# Patient Record
Sex: FEMALE | Race: BLACK OR AFRICAN AMERICAN | Employment: OTHER | URBAN - METROPOLITAN AREA
[De-identification: names, ages, dates, MRNs, and addresses within clinical notes are randomized per-mention and may not be internally consistent; named-entity substitution may affect disease eponyms.]

---

## 2017-01-05 PROBLEM — N88.3 CERVICAL INCOMPETENCE: Status: ACTIVE | Noted: 2017-01-05

## 2017-01-16 PROBLEM — O34.32 MATERNAL CARE FOR CERVICAL INCOMPETENCE IN SECOND TRIMESTER: Status: ACTIVE | Noted: 2017-01-05

## 2017-01-18 PROBLEM — O26.879 CERVICAL SHORTENING: Status: ACTIVE | Noted: 2017-01-18

## 2017-01-19 ENCOUNTER — HOSPITAL ENCOUNTER (EMERGENCY)
Age: 32
Discharge: HOME | End: 2017-01-20
Attending: OBSTETRICS & GYNECOLOGY | Admitting: OBSTETRICS & GYNECOLOGY
Payer: COMMERCIAL

## 2017-01-19 NOTE — IP AVS SNAPSHOT
Paula Jc 
 
 
 [address] [place] [address] 
[phone] Patient: Alexandria Clark MRN: [id number] :1985 You are allergic to the following No active allergies Recent Documentation Height Weight BMI OB Status Smoking Status 1.549 m 80.7 kg 33.63 kg/m2 Pregnant Never Smoker Emergency Contacts Name Discharge Info Relation Home Work Mobile Belkys Vidal  Mother [14] [phone] About your hospitalization You were admitted on:  N/A You last received care in the:  [facility] [place] [facility] You were discharged on:  2017 Unit phone number:  [phone] Why you were hospitalized Your primary diagnosis was:  Not on File Providers Seen During Your Hospitalizations Provider Role Specialty Primary office phone Tiffany Jeffries MD Attending Provider Obstetrics & Gynecology [phone] Your Primary Care Physician (PCP) Primary Care Physician Office Phone Office Fax Melissa [facility] [phone] Follow-up Information None Your Appointments 2017  9:45 AM EST  
CERVICAL LENGTH with [facility] ULTRASOUND 3  
[facility] MATERNAL FETAL MEDICINE ([address] [place] [address]) [address] [place] [id number] [phone] 2017 10:30 AM EST  
OB VISIT with Sandor Marin MD  
[address] [place] [address] ([address] [place] [address]) [address] [place] [id number] [phone] 2017  3:45 PM EST  
OB VISIT with Ivan Owens DO  
[address] [address] [phone] Current Discharge Medication List  
  
ASK your doctor about these medications Dose & Instructions Dispensing Information Comments Morning Noon Evening Bedtime  
 amoxicillin 250 mg capsule Commonly known as:  AMOXIL Your next dose is: Today, Tomorrow Other:  _________ Dose:  500 mg Take 2 Caps by mouth three (3) times daily for 3 days. Indications: GENITOURINARY TRACT INFECTION DUE TO PROTEUS Quantity:  9 Cap Refills:  0  
     
   
   
   
  
 azithromycin 250 mg tablet Commonly known as:  Unice Messing Your next dose is: Today, Tomorrow Other:  _________ Dose:  500 mg Take 2 Tabs by mouth daily for 3 doses. Quantity:  6 Tab Refills:  0  
     
   
   
   
  
 calcium carbonate-mag hydroxid 1,000-200 mg Janine Dears Your next dose is: Today, Tomorrow Other:  _________ Take  by mouth. Refills:  0 Cholecalciferol (Vitamin D3) 3,000 unit Tab Your next dose is: Today, Tomorrow Other:  _________ Take  by mouth. Refills:  0  
     
   
   
   
  
 escitalopram oxalate 5 mg tablet Commonly known as:  Jose Jacobs Your next dose is: Today, Tomorrow Other:  _________ Dose:  10 mg Take 2 Tabs by mouth nightly. Quantity:  30 Tab Refills:  6  
     
   
   
   
  
 famotidine 20 mg tablet Commonly known as:  PEPCID Your next dose is: Today, Tomorrow Other:  _________ Dose:  20 mg Take 1 Tab by mouth two (2) times a day. Quantity:  60 Tab Refills:  6  
     
   
   
   
  
 metroNIDAZOLE 500 mg tablet Commonly known as:  FLAGYL Your next dose is: Today, Tomorrow Other:  _________ Dose:  500 mg Take 1 Tab by mouth three (3) times daily. Quantity:  15 Tab Refills:  0 PRENATAL DHA+COMPLETE PRENATAL -300 mg-mcg-mg Cmpk Generic drug:  PNV no.24-iron-folic acid-dha Your next dose is: Today, Tomorrow Other:  _________ Take  by mouth. Refills:  0 progesterone 200 mg capsule Commonly known as:  PROMETRIUM Your next dose is: Today, Tomorrow Other:  _________ Dose:  200 mg Insert 1 Cap into vagina nightly. Quantity:  30 Cap Refills:  6  
     
   
   
   
  
 TYLENOL EXTRA STRENGTH 500 mg tablet Generic drug:  acetaminophen Your next dose is: Today, Tomorrow Other:  _________ Take  by mouth every six (6) hours as needed for Pain. Refills:  0  
     
   
   
   
  
 ZANTAC 150 mg tablet Generic drug:  raNITIdine Your next dose is: Today, Tomorrow Other:  _________ Dose:  150 mg Take 150 mg by mouth two (2) times a day. Refills:  0 Discharge Instructions What to look for: 6 or more contractions lasting 40 seconds or longer, rupture of membranes, decreased fetal movement, bright red vaginal bleeding. To maintain hydration drink 8-10 glasses of water per day(2liters), keep all scheduled appointments. Learning About When to Call Your Doctor During Pregnancy (After 20 Weeks) Your Care Instructions It's common to have concerns about what might be a problem during pregnancy. Although most pregnant women don't have any serious problems, it's important to know when to call your doctor if you have certain symptoms or signs of labor. These are general suggestions. Your doctor may give you some more information about when to call. When to call your doctor (after 20 weeks) Call 911 anytime you think you may need emergency care. For example, call if: 
· You have severe vaginal bleeding. · You have sudden, severe pain in your belly. · You passed out (lost consciousness). · You have a seizure. · You see or feel the umbilical cord. · You think you are about to deliver your baby and can't make it safely to the hospital. 
Call your doctor now or seek immediate medical care if: 
· You have vaginal bleeding. · You have belly pain. · You have a fever. · You have symptoms of preeclampsia, such as: 
¨ Sudden swelling of your face, hands, or feet. ¨ New vision problems (such as dimness or blurring). ¨ A severe headache. · You have a sudden release of fluid from your vagina. (You think your water broke.) · You think that you may be in labor. This means that you've had at least 4 contractions within 20 minutes or at least 8 contractions in an hour. · You notice that your baby has stopped moving or is moving much less than normal. 
· You have symptoms of a urinary tract infection. These may include: 
¨ Pain or burning when you urinate. ¨ A frequent need to urinate without being able to pass much urine. ¨ Pain in the flank, which is just below the rib cage and above the waist on either side of the back. ¨ Blood in your urine. Watch closely for changes in your health, and be sure to contact your doctor if: 
· You have vaginal discharge that smells bad. · You have skin changes, such as: ¨ A rash. ¨ Itching. ¨ Yellow color to your skin. · You have other concerns about your pregnancy. If you have labor signs at 37 weeks or more If you have signs of labor at 37 weeks or more, your doctor may tell you to call when your labor becomes more active. Symptoms of active labor include: 
· Contractions that are regular. · Contractions that are less than 5 minutes apart. · Contractions that are hard to talk through. Follow-up care is a key part of your treatment and safety. Be sure to make and go to all appointments, and call your doctor if you are having problems. It's also a good idea to know your test results and keep a list of the medicines you take. Where can you learn more? Go to http://steven-viviane.info/. Enter  in the search box to learn more about \"Learning About When to Call Your Doctor During Pregnancy (After 20 Weeks). \" 
Current as of: May 30, 2016 Content Version: 11.1 © 5507-1050 Healthwise, Incorporated. Care instructions adapted under license by [employer] (which disclaims liability or warranty for this information). If you have questions about a medical condition or this instruction, always ask your healthcare professional. [address] any warranty or liability for your use of this information. Discharge Orders None Introducing [facility]! Crescenciorafael Hargrove introduces SlideJar patient portal. Now you can access parts of your medical record, email your doctor's office, and request medication refills online. 1. In your internet browser, go to [email or web address] 2. Click on the First Time User? Click Here link in the Sign In box. You will see the New Member Sign Up page. 3. Enter your SlideJar Access Code exactly as it appears below. You will not need to use this code after youve completed the sign-up process. If you do not sign up before the expiration date, you must request a new code. · SlideJar Access Code: [email or web address] Expires: 4/5/2017  2:48 PM 
 
4. Enter the last four digits of your Social Security Number (xxxx) and Date of Birth (mm/dd/yyyy) as indicated and click Submit. You will be taken to the next sign-up page. 5. Create a SlideJar ID. This will be your SlideJar login ID and cannot be changed, so think of one that is secure and easy to remember. 6. Create a SlideJar password. You can change your password at any time. 7. Enter your Password Reset Question and Answer. This can be used at a later time if you forget your password. 8. Enter your e-mail address. You will receive e-mail notification when new information is available in [address] 9. Click Sign Up. You can now view and download portions of your medical record. 10. Click the Download Summary menu link to download a portable copy of your medical information. If you have questions, please visit the Frequently Asked Questions section of the MyChart website. Remember, MyChart is NOT to be used for urgent needs. For medical emergencies, dial 911. Now available from your iPhone and Android! General Information Please provide this summary of care documentation to your next provider. Patient Signature:  ____________________________________________________________ Date:  ____________________________________________________________  
  
Christian Batman Provider Signature:  ____________________________________________________________ Date:  ____________________________________________________________

## 2017-01-19 NOTE — IP AVS SNAPSHOT
Current Discharge Medication List  
  
ASK your doctor about these medications Dose & Instructions Dispensing Information Comments Morning Noon Evening Bedtime  
 amoxicillin 250 mg capsule Commonly known as:  AMOXIL Your next dose is: Today, Tomorrow Other:  ____________ Dose:  500 mg Take 2 Caps by mouth three (3) times daily for 3 days. Indications: GENITOURINARY TRACT INFECTION DUE TO PROTEUS Quantity:  9 Cap Refills:  0  
     
   
   
   
  
 azithromycin 250 mg tablet Commonly known as:  Keily Lit Your next dose is: Today, Tomorrow Other:  ____________ Dose:  500 mg Take 2 Tabs by mouth daily for 3 doses. Quantity:  6 Tab Refills:  0  
     
   
   
   
  
 calcium carbonate-mag hydroxid 1,000-200 mg Esther Handy Your next dose is: Today, Tomorrow Other:  ____________ Take  by mouth. Refills:  0 Cholecalciferol (Vitamin D3) 3,000 unit Tab Your next dose is: Today, Tomorrow Other:  ____________ Take  by mouth. Refills:  0  
     
   
   
   
  
 escitalopram oxalate 5 mg tablet Commonly known as:  Beaverton Denier Your next dose is: Today, Tomorrow Other:  ____________ Dose:  10 mg Take 2 Tabs by mouth nightly. Quantity:  30 Tab Refills:  6  
     
   
   
   
  
 famotidine 20 mg tablet Commonly known as:  PEPCID Your next dose is: Today, Tomorrow Other:  ____________ Dose:  20 mg Take 1 Tab by mouth two (2) times a day. Quantity:  60 Tab Refills:  6  
     
   
   
   
  
 metroNIDAZOLE 500 mg tablet Commonly known as:  FLAGYL Your next dose is: Today, Tomorrow Other:  ____________ Dose:  500 mg Take 1 Tab by mouth three (3) times daily. Quantity:  15 Tab Refills:  0 PRENATAL DHA+COMPLETE PRENATAL -300 mg-mcg-mg Cmpk Generic drug:  PNV no.24-iron-folic acid-dha Your next dose is: Today, Tomorrow Other:  ____________ Take  by mouth. Refills:  0  
     
   
   
   
  
 progesterone 200 mg capsule Commonly known as:  PROMETRIUM Your next dose is: Today, Tomorrow Other:  ____________ Dose:  200 mg Insert 1 Cap into vagina nightly. Quantity:  30 Cap Refills:  6  
     
   
   
   
  
 TYLENOL EXTRA STRENGTH 500 mg tablet Generic drug:  acetaminophen Your next dose is: Today, Tomorrow Other:  ____________ Take  by mouth every six (6) hours as needed for Pain. Refills:  0  
     
   
   
   
  
 ZANTAC 150 mg tablet Generic drug:  raNITIdine Your next dose is: Today, Tomorrow Other:  ____________ Dose:  150 mg Take 150 mg by mouth two (2) times a day. Refills:  0

## 2017-01-20 VITALS
HEIGHT: 61 IN | BODY MASS INDEX: 33.61 KG/M2 | WEIGHT: 178 LBS | TEMPERATURE: 98.4 F | SYSTOLIC BLOOD PRESSURE: 123 MMHG | DIASTOLIC BLOOD PRESSURE: 67 MMHG | HEART RATE: 97 BPM | RESPIRATION RATE: 20 BRPM

## 2017-01-20 PROCEDURE — 74011250637 HC RX REV CODE- 250/637: Performed by: OBSTETRICS & GYNECOLOGY

## 2017-01-20 PROCEDURE — 99283 EMERGENCY DEPT VISIT LOW MDM: CPT

## 2017-01-20 RX ORDER — HYDROCODONE BITARTRATE AND ACETAMINOPHEN 10; 325 MG/1; MG/1
1 TABLET ORAL ONCE
Status: COMPLETED | OUTPATIENT
Start: 2017-01-20 | End: 2017-01-20

## 2017-01-20 RX ADMIN — HYDROCODONE BITARTRATE AND ACETAMINOPHEN 1 TABLET: 10; 325 TABLET ORAL at 00:53

## 2017-01-20 NOTE — ED PROVIDER NOTES
32 y.o. female at 22w0d  weeks gestation who is seen for moderate headache . Headache ongoing past few days. Worse tonight. No visual changes. lortab was helpful a few days ago. HISTORY:    History   Sexual Activity    Sexual activity: Yes    Partners: Male    Birth control/ protection: None     Patient's last menstrual period was 09/07/2016 (exact date). Social History     Social History    Marital status: SINGLE     Spouse name: N/A    Number of children: N/A    Years of education: N/A     Occupational History    Not on file. Social History Main Topics    Smoking status: Never Smoker    Smokeless tobacco: Never Used    Alcohol use No      Comment: occ    Drug use: No    Sexual activity: Yes     Partners: Male     Birth control/ protection: None     Other Topics Concern    Not on file     Social History Narrative       Past Surgical History   Procedure Laterality Date    Hx wisdom teeth extraction      Hx dilation and curettage         Past Medical History   Diagnosis Date    Asthma      exercise induced    Supervision of high-risk pregnancy 11/9/2016    Vaginal bleeding in first trimester 12/2/2016     No further bleeding. ROS:  A 12 point review of symptoms negative except for chief complaint as described above. PHYSICAL EXAM:  Blood pressure 123/67, pulse 97, temperature 98.4 °F (36.9 °C), resp. rate 20, height 5' 1\" (1.549 m), weight 80.7 kg (178 lb), last menstrual period 09/07/2016, unknown if currently breastfeeding. The patient appears well, alert, oriented x 3. Lungs are clear. Heart RRR, no murmurs.    Abdomen soft, nontender, no guarding  No cva tenderness  No fundal tenderness  Upper ext: no edema, reflexes +2  Lower ext: no edema, neg dana's, reflexes +2  SVE:deferred  FHT:+  No neuro deficits      Assessment/Plan: headache in pregancy  Discussed tylenol, caffeine  Given norco 10/325 in triage  F/u as scheduled or sooner as needed

## 2017-01-20 NOTE — DISCHARGE INSTRUCTIONS
What to look for: 6 or more contractions lasting 40 seconds or longer, rupture of membranes, decreased fetal movement, bright red vaginal bleeding. To maintain hydration drink 8-10 glasses of water per day(2liters), keep all scheduled appointments. Learning About When to Call Your Doctor During Pregnancy (After 20 Weeks)  Your Care Instructions  It's common to have concerns about what might be a problem during pregnancy. Although most pregnant women don't have any serious problems, it's important to know when to call your doctor if you have certain symptoms or signs of labor. These are general suggestions. Your doctor may give you some more information about when to call. When to call your doctor (after 20 weeks)  Call 911 anytime you think you may need emergency care. For example, call if:  · You have severe vaginal bleeding. · You have sudden, severe pain in your belly. · You passed out (lost consciousness). · You have a seizure. · You see or feel the umbilical cord. · You think you are about to deliver your baby and can't make it safely to the hospital.  Call your doctor now or seek immediate medical care if:  · You have vaginal bleeding. · You have belly pain. · You have a fever. · You have symptoms of preeclampsia, such as:  ¨ Sudden swelling of your face, hands, or feet. ¨ New vision problems (such as dimness or blurring). ¨ A severe headache. · You have a sudden release of fluid from your vagina. (You think your water broke.)  · You think that you may be in labor. This means that you've had at least 4 contractions within 20 minutes or at least 8 contractions in an hour. · You notice that your baby has stopped moving or is moving much less than normal.  · You have symptoms of a urinary tract infection. These may include:  ¨ Pain or burning when you urinate. ¨ A frequent need to urinate without being able to pass much urine.   ¨ Pain in the flank, which is just below the rib cage and above the waist on either side of the back. ¨ Blood in your urine. Watch closely for changes in your health, and be sure to contact your doctor if:  · You have vaginal discharge that smells bad. · You have skin changes, such as:  ¨ A rash. ¨ Itching. ¨ Yellow color to your skin. · You have other concerns about your pregnancy. If you have labor signs at 37 weeks or more  If you have signs of labor at 37 weeks or more, your doctor may tell you to call when your labor becomes more active. Symptoms of active labor include:  · Contractions that are regular. · Contractions that are less than 5 minutes apart. · Contractions that are hard to talk through. Follow-up care is a key part of your treatment and safety. Be sure to make and go to all appointments, and call your doctor if you are having problems. It's also a good idea to know your test results and keep a list of the medicines you take. Where can you learn more? Go to http://steven-viviane.info/. Enter  in the search box to learn more about \"Learning About When to Call Your Doctor During Pregnancy (After 20 Weeks). \"  Current as of: May 30, 2016  Content Version: 11.1  © 1838-1243 [employer]. Care instructions adapted under license by [employer] (which disclaims liability or warranty for this information). If you have questions about a medical condition or this instruction, always ask your healthcare professional. [place] [address] any warranty or liability for your use of this information.

## 2017-04-15 ENCOUNTER — HOSPITAL ENCOUNTER (EMERGENCY)
Age: 32
Discharge: HOME | End: 2017-04-15
Payer: COMMERCIAL

## 2017-04-15 VITALS
RESPIRATION RATE: 18 BRPM | HEIGHT: 61 IN | OXYGEN SATURATION: 96 % | DIASTOLIC BLOOD PRESSURE: 77 MMHG | HEART RATE: 72 BPM | BODY MASS INDEX: 33.99 KG/M2 | WEIGHT: 180 LBS | SYSTOLIC BLOOD PRESSURE: 143 MMHG

## 2017-04-15 DIAGNOSIS — K64.5 HEMORRHOIDS, THROMBOSED: Primary | ICD-10-CM

## 2017-04-15 PROCEDURE — 99282 EMERGENCY DEPT VISIT SF MDM: CPT

## 2017-04-15 RX ORDER — HYDROCORTISONE 25 MG/G
CREAM TOPICAL 4 TIMES DAILY
Qty: 30 G | Refills: 0 | Status: SHIPPED | OUTPATIENT
Start: 2017-04-15 | End: 2017-04-15

## 2017-04-15 RX ORDER — POLYETHYLENE GLYCOL 3350 17 G/17G
17 POWDER, FOR SOLUTION ORAL DAILY
COMMUNITY

## 2017-04-15 RX ORDER — HYDROCORTISONE 25 MG/G
CREAM TOPICAL 4 TIMES DAILY
Qty: 30 G | Refills: 0 | Status: SHIPPED | OUTPATIENT
Start: 2017-04-15

## 2017-04-15 RX ORDER — AMOXICILLIN AND CLAVULANATE POTASSIUM 875; 125 MG/1; MG/1
1 TABLET, FILM COATED ORAL 2 TIMES DAILY
Qty: 14 TAB | Refills: 0 | Status: SHIPPED | OUTPATIENT
Start: 2017-04-15 | End: 2017-04-15

## 2017-04-15 RX ORDER — AMOXICILLIN AND CLAVULANATE POTASSIUM 875; 125 MG/1; MG/1
1 TABLET, FILM COATED ORAL 2 TIMES DAILY
Qty: 14 TAB | Refills: 0 | Status: SHIPPED | OUTPATIENT
Start: 2017-04-15

## 2017-04-15 RX ORDER — DOCUSATE SODIUM 100 MG/1
200 CAPSULE, LIQUID FILLED ORAL 2 TIMES DAILY
COMMUNITY

## 2017-04-15 NOTE — ED NOTES
Pt presents to ER for hemorrhoidal pain that has increased since delivering her daughter this past Tues. Pt has been unable to sit and states that she has been blood and purulence from the site.

## 2017-04-17 ENCOUNTER — HOSPITAL ENCOUNTER (OUTPATIENT)
Age: 32
Setting detail: OUTPATIENT SURGERY
End: 2017-04-17
Attending: SURGERY | Admitting: SURGERY

## 2017-04-17 ENCOUNTER — HOSPITAL ENCOUNTER (OUTPATIENT)
Dept: SURGERY | Age: 32
Discharge: HOME | End: 2017-04-17

## 2017-04-17 VITALS — HEIGHT: 61 IN | BODY MASS INDEX: 32.1 KG/M2 | WEIGHT: 170 LBS

## 2017-04-17 NOTE — PERIOP NOTES
Received phone call from Fairy Primrose in [place] scheduling, pt called and case has been added back on to surgery schedule for tomorrow. Informed Fairy Primrose that complete phone assessment performed earlier today. Chart sent to Pre Op.

## 2017-04-17 NOTE — PERIOP NOTES
Lab results in EMR dated 4/10/17 and 4/11/17 under Care Everywhere/ [facility] for reference.

## 2017-04-17 NOTE — PERIOP NOTES
Patient verified name, , and surgery as listed in [facility]. Type 1B surgery, phone assessment complete. Orders not received. Labs per surgeon: none  Labs per anesthesia protocol: none      Patient answered medical/surgical history questions at their best of ability. All prior to admission medications documented in [facility]. Patient instructed to take the following medications the day of surgery according to anesthesia guidelines with a small sip of water: none . Hold all vitamins 7 days prior to surgery and NSAIDS 5 days prior to surgery. Medications to be held vitamins    Patient instructed on the following and verbalizes understanding:  Arrive at A Entrance, time of arrival to be called the day before by 1700  NPO after midnight including gum, mints, and ice chips  Responsible adult must drive patient to the hospital, stay during surgery, and patient will  need supervision 24 hours after anesthesia  Use dial in shower the night before surgery and on the morning of surgery  Leave all valuables(money and jewelry) at home but bring insurance card and ID on DOS  Do not wear make-up, nail polish, lotions, cologne, perfumes, powders, or oil on skin.

## 2017-04-17 NOTE — PERIOP NOTES
Received call from Jourdan at [address] [place] [address] scheduling office, pt has cancelled procedure.

## 2017-04-18 ENCOUNTER — HOSPITAL ENCOUNTER (OUTPATIENT)
Age: 32
Setting detail: OUTPATIENT SURGERY
Discharge: HOME | End: 2017-04-18
Attending: SURGERY | Admitting: SURGERY
Payer: COMMERCIAL

## 2017-04-18 ENCOUNTER — ANESTHESIA EVENT (OUTPATIENT)
Dept: SURGERY | Age: 32
End: 2017-04-18
Payer: COMMERCIAL

## 2017-04-18 ENCOUNTER — ANESTHESIA (OUTPATIENT)
Dept: SURGERY | Age: 32
End: 2017-04-18
Payer: COMMERCIAL

## 2017-04-18 VITALS
DIASTOLIC BLOOD PRESSURE: 70 MMHG | WEIGHT: 168.8 LBS | OXYGEN SATURATION: 96 % | BODY MASS INDEX: 31.87 KG/M2 | TEMPERATURE: 98 F | HEIGHT: 61 IN | HEART RATE: 80 BPM | SYSTOLIC BLOOD PRESSURE: 141 MMHG | RESPIRATION RATE: 16 BRPM

## 2017-04-18 DIAGNOSIS — O22.40: Primary | ICD-10-CM

## 2017-04-18 LAB — HCG UR QL: NEGATIVE

## 2017-04-18 PROCEDURE — 74011250636 HC RX REV CODE- 250/636

## 2017-04-18 PROCEDURE — 76210000016 HC OR PH I REC 1 TO 1.5 HR: Performed by: SURGERY

## 2017-04-18 PROCEDURE — 74011000250 HC RX REV CODE- 250: Performed by: SURGERY

## 2017-04-18 PROCEDURE — 74011250637 HC RX REV CODE- 250/637: Performed by: ANESTHESIOLOGY

## 2017-04-18 PROCEDURE — 77030014008 HC SPNG HEMSTAT J&J -C: Performed by: SURGERY

## 2017-04-18 PROCEDURE — 77030031753 HC SHR ENDO COAG HARM J&J -E: Performed by: SURGERY

## 2017-04-18 PROCEDURE — 74011000250 HC RX REV CODE- 250

## 2017-04-18 PROCEDURE — 77030011640 HC PAD GRND REM COVD -A: Performed by: SURGERY

## 2017-04-18 PROCEDURE — 76060000032 HC ANESTHESIA 0.5 TO 1 HR: Performed by: SURGERY

## 2017-04-18 PROCEDURE — 74011250636 HC RX REV CODE- 250/636: Performed by: SURGERY

## 2017-04-18 PROCEDURE — 88304 TISSUE EXAM BY PATHOLOGIST: CPT | Performed by: SURGERY

## 2017-04-18 PROCEDURE — 74011000250 HC RX REV CODE- 250: Performed by: ANESTHESIOLOGY

## 2017-04-18 PROCEDURE — 76010000138 HC OR TIME 0.5 TO 1 HR: Performed by: SURGERY

## 2017-04-18 PROCEDURE — 77030032490 HC SLV COMPR SCD KNE COVD -B: Performed by: SURGERY

## 2017-04-18 PROCEDURE — 81025 URINE PREGNANCY TEST: CPT

## 2017-04-18 PROCEDURE — 77030018390 HC SPNG HEMSTAT2 J&J -B: Performed by: SURGERY

## 2017-04-18 PROCEDURE — 74011250636 HC RX REV CODE- 250/636: Performed by: ANESTHESIOLOGY

## 2017-04-18 PROCEDURE — 77030020143 HC AIRWY LARYN INTUB CGAS -A: Performed by: ANESTHESIOLOGY

## 2017-04-18 PROCEDURE — 76210000021 HC REC RM PH II 0.5 TO 1 HR: Performed by: SURGERY

## 2017-04-18 PROCEDURE — 77030018836 HC SOL IRR NACL ICUM -A: Performed by: SURGERY

## 2017-04-18 PROCEDURE — 77030020782 HC GWN BAIR PAWS FLX 3M -B: Performed by: ANESTHESIOLOGY

## 2017-04-18 RX ORDER — KETOROLAC TROMETHAMINE 30 MG/ML
INJECTION, SOLUTION INTRAMUSCULAR; INTRAVENOUS AS NEEDED
Status: DISCONTINUED | OUTPATIENT
Start: 2017-04-18 | End: 2017-04-18 | Stop reason: HOSPADM

## 2017-04-18 RX ORDER — ADHESIVE BANDAGE
30 BANDAGE TOPICAL DAILY
Qty: 1 BOTTLE | Refills: 3 | Status: SHIPPED | OUTPATIENT
Start: 2017-04-18

## 2017-04-18 RX ORDER — FENTANYL CITRATE 50 UG/ML
INJECTION, SOLUTION INTRAMUSCULAR; INTRAVENOUS AS NEEDED
Status: DISCONTINUED | OUTPATIENT
Start: 2017-04-18 | End: 2017-04-18 | Stop reason: HOSPADM

## 2017-04-18 RX ORDER — SODIUM CHLORIDE 0.9 % (FLUSH) 0.9 %
5-10 SYRINGE (ML) INJECTION AS NEEDED
Status: CANCELLED | OUTPATIENT
Start: 2017-04-18

## 2017-04-18 RX ORDER — FAMOTIDINE 20 MG/1
20 TABLET, FILM COATED ORAL ONCE
Status: COMPLETED | OUTPATIENT
Start: 2017-04-18 | End: 2017-04-18

## 2017-04-18 RX ORDER — MIDAZOLAM HYDROCHLORIDE 1 MG/ML
5 INJECTION, SOLUTION INTRAMUSCULAR; INTRAVENOUS ONCE
Status: CANCELLED | OUTPATIENT
Start: 2017-04-18 | End: 2017-04-18

## 2017-04-18 RX ORDER — OXYCODONE AND ACETAMINOPHEN 5; 325 MG/1; MG/1
2 TABLET ORAL
Qty: 40 TAB | Refills: 0 | Status: SHIPPED | OUTPATIENT
Start: 2017-04-18

## 2017-04-18 RX ORDER — SILVER SULFADIAZINE 10 G/1000G
CREAM TOPICAL AS NEEDED
Status: DISCONTINUED | OUTPATIENT
Start: 2017-04-18 | End: 2017-04-18 | Stop reason: HOSPADM

## 2017-04-18 RX ORDER — LIDOCAINE HYDROCHLORIDE 20 MG/ML
INJECTION, SOLUTION EPIDURAL; INFILTRATION; INTRACAUDAL; PERINEURAL AS NEEDED
Status: DISCONTINUED | OUTPATIENT
Start: 2017-04-18 | End: 2017-04-18 | Stop reason: HOSPADM

## 2017-04-18 RX ORDER — HYDROCODONE BITARTRATE AND ACETAMINOPHEN 5; 325 MG/1; MG/1
1 TABLET ORAL AS NEEDED
Status: DISCONTINUED | OUTPATIENT
Start: 2017-04-18 | End: 2017-04-18 | Stop reason: HOSPADM

## 2017-04-18 RX ORDER — OXYCODONE AND ACETAMINOPHEN 5; 325 MG/1; MG/1
1 TABLET ORAL ONCE
Status: COMPLETED | OUTPATIENT
Start: 2017-04-18 | End: 2017-04-18

## 2017-04-18 RX ORDER — BUPIVACAINE HYDROCHLORIDE AND EPINEPHRINE 2.5; 5 MG/ML; UG/ML
INJECTION, SOLUTION EPIDURAL; INFILTRATION; INTRACAUDAL; PERINEURAL AS NEEDED
Status: DISCONTINUED | OUTPATIENT
Start: 2017-04-18 | End: 2017-04-18 | Stop reason: HOSPADM

## 2017-04-18 RX ORDER — CEFAZOLIN SODIUM IN 0.9 % NACL 2 G/50 ML
2 INTRAVENOUS SOLUTION, PIGGYBACK (ML) INTRAVENOUS ONCE
Status: COMPLETED | OUTPATIENT
Start: 2017-04-18 | End: 2017-04-18

## 2017-04-18 RX ORDER — HYDROMORPHONE HYDROCHLORIDE 2 MG/ML
0.5 INJECTION, SOLUTION INTRAMUSCULAR; INTRAVENOUS; SUBCUTANEOUS
Status: DISCONTINUED | OUTPATIENT
Start: 2017-04-18 | End: 2017-04-18 | Stop reason: HOSPADM

## 2017-04-18 RX ORDER — SODIUM CHLORIDE, SODIUM LACTATE, POTASSIUM CHLORIDE, CALCIUM CHLORIDE 600; 310; 30; 20 MG/100ML; MG/100ML; MG/100ML; MG/100ML
150 INJECTION, SOLUTION INTRAVENOUS CONTINUOUS
Status: DISCONTINUED | OUTPATIENT
Start: 2017-04-18 | End: 2017-04-18 | Stop reason: HOSPADM

## 2017-04-18 RX ORDER — ONDANSETRON 2 MG/ML
INJECTION INTRAMUSCULAR; INTRAVENOUS AS NEEDED
Status: DISCONTINUED | OUTPATIENT
Start: 2017-04-18 | End: 2017-04-18 | Stop reason: HOSPADM

## 2017-04-18 RX ORDER — MINERAL OIL
15 OIL (ML) ORAL DAILY
Qty: 1 BOTTLE | Refills: 3 | Status: SHIPPED | OUTPATIENT
Start: 2017-04-18

## 2017-04-18 RX ORDER — ACETAMINOPHEN 500 MG
1000 TABLET ORAL
Status: DISCONTINUED | OUTPATIENT
Start: 2017-04-18 | End: 2017-04-18 | Stop reason: HOSPADM

## 2017-04-18 RX ORDER — DEXAMETHASONE SODIUM PHOSPHATE 4 MG/ML
INJECTION, SOLUTION INTRA-ARTICULAR; INTRALESIONAL; INTRAMUSCULAR; INTRAVENOUS; SOFT TISSUE AS NEEDED
Status: DISCONTINUED | OUTPATIENT
Start: 2017-04-18 | End: 2017-04-18 | Stop reason: HOSPADM

## 2017-04-18 RX ORDER — FENTANYL CITRATE 50 UG/ML
100 INJECTION, SOLUTION INTRAMUSCULAR; INTRAVENOUS ONCE
Status: CANCELLED | OUTPATIENT
Start: 2017-04-18 | End: 2017-04-18

## 2017-04-18 RX ORDER — SODIUM CHLORIDE 0.9 % (FLUSH) 0.9 %
5-10 SYRINGE (ML) INJECTION AS NEEDED
Status: DISCONTINUED | OUTPATIENT
Start: 2017-04-18 | End: 2017-04-18 | Stop reason: HOSPADM

## 2017-04-18 RX ORDER — DIBUCAINE 1 %
OINTMENT (GRAM) TOPICAL 3 TIMES DAILY
Qty: 30 G | Refills: 0 | Status: SHIPPED | OUTPATIENT
Start: 2017-04-18

## 2017-04-18 RX ORDER — PROPOFOL 10 MG/ML
INJECTION, EMULSION INTRAVENOUS AS NEEDED
Status: DISCONTINUED | OUTPATIENT
Start: 2017-04-18 | End: 2017-04-18 | Stop reason: HOSPADM

## 2017-04-18 RX ORDER — SODIUM CHLORIDE 9 MG/ML
50 INJECTION, SOLUTION INTRAVENOUS CONTINUOUS
Status: DISCONTINUED | OUTPATIENT
Start: 2017-04-18 | End: 2017-04-18 | Stop reason: HOSPADM

## 2017-04-18 RX ORDER — SODIUM CHLORIDE 0.9 % (FLUSH) 0.9 %
5-10 SYRINGE (ML) INJECTION EVERY 8 HOURS
Status: CANCELLED | OUTPATIENT
Start: 2017-04-18

## 2017-04-18 RX ORDER — LIDOCAINE HYDROCHLORIDE 10 MG/ML
0.1 INJECTION INFILTRATION; PERINEURAL AS NEEDED
Status: DISCONTINUED | OUTPATIENT
Start: 2017-04-18 | End: 2017-04-18 | Stop reason: HOSPADM

## 2017-04-18 RX ORDER — FAMOTIDINE 10 MG/ML
20 INJECTION INTRAVENOUS ONCE
Status: CANCELLED | OUTPATIENT
Start: 2017-04-18 | End: 2017-04-18

## 2017-04-18 RX ORDER — LIDOCAINE HYDROCHLORIDE 20 MG/ML
JELLY TOPICAL AS NEEDED
Status: DISCONTINUED | OUTPATIENT
Start: 2017-04-18 | End: 2017-04-18 | Stop reason: HOSPADM

## 2017-04-18 RX ORDER — MIDAZOLAM HYDROCHLORIDE 1 MG/ML
2 INJECTION, SOLUTION INTRAMUSCULAR; INTRAVENOUS
Status: DISCONTINUED | OUTPATIENT
Start: 2017-04-18 | End: 2017-04-18 | Stop reason: HOSPADM

## 2017-04-18 RX ADMIN — CEFAZOLIN 2 G: 1 INJECTION, POWDER, FOR SOLUTION INTRAMUSCULAR; INTRAVENOUS; PARENTERAL at 15:04

## 2017-04-18 RX ADMIN — FENTANYL CITRATE 50 MCG: 50 INJECTION, SOLUTION INTRAMUSCULAR; INTRAVENOUS at 15:01

## 2017-04-18 RX ADMIN — OXYCODONE HYDROCHLORIDE AND ACETAMINOPHEN 1 TABLET: 5; 325 TABLET ORAL at 16:42

## 2017-04-18 RX ADMIN — HYDROMORPHONE HYDROCHLORIDE 0.5 MG: 2 INJECTION, SOLUTION INTRAMUSCULAR; INTRAVENOUS; SUBCUTANEOUS at 16:07

## 2017-04-18 RX ADMIN — HYDROMORPHONE HYDROCHLORIDE 0.5 MG: 2 INJECTION, SOLUTION INTRAMUSCULAR; INTRAVENOUS; SUBCUTANEOUS at 16:12

## 2017-04-18 RX ADMIN — LIDOCAINE HYDROCHLORIDE 0.1 ML: 10 INJECTION, SOLUTION INFILTRATION; PERINEURAL at 13:15

## 2017-04-18 RX ADMIN — ONDANSETRON 4 MG: 2 INJECTION INTRAMUSCULAR; INTRAVENOUS at 15:09

## 2017-04-18 RX ADMIN — SODIUM CHLORIDE, SODIUM LACTATE, POTASSIUM CHLORIDE, AND CALCIUM CHLORIDE: 600; 310; 30; 20 INJECTION, SOLUTION INTRAVENOUS at 14:58

## 2017-04-18 RX ADMIN — MIDAZOLAM HYDROCHLORIDE 2 MG: 1 INJECTION, SOLUTION INTRAMUSCULAR; INTRAVENOUS at 14:47

## 2017-04-18 RX ADMIN — PROPOFOL 200 MG: 10 INJECTION, EMULSION INTRAVENOUS at 15:01

## 2017-04-18 RX ADMIN — FAMOTIDINE 20 MG: 20 TABLET ORAL at 12:49

## 2017-04-18 RX ADMIN — FENTANYL CITRATE 25 MCG: 50 INJECTION, SOLUTION INTRAMUSCULAR; INTRAVENOUS at 15:11

## 2017-04-18 RX ADMIN — LIDOCAINE HYDROCHLORIDE 100 MG: 20 INJECTION, SOLUTION EPIDURAL; INFILTRATION; INTRACAUDAL; PERINEURAL at 15:01

## 2017-04-18 RX ADMIN — KETOROLAC TROMETHAMINE 30 MG: 30 INJECTION, SOLUTION INTRAMUSCULAR; INTRAVENOUS at 15:34

## 2017-04-18 RX ADMIN — SODIUM CHLORIDE, SODIUM LACTATE, POTASSIUM CHLORIDE, AND CALCIUM CHLORIDE 150 ML/HR: 600; 310; 30; 20 INJECTION, SOLUTION INTRAVENOUS at 13:15

## 2017-04-18 RX ADMIN — HYDROMORPHONE HYDROCHLORIDE 0.5 MG: 2 INJECTION, SOLUTION INTRAMUSCULAR; INTRAVENOUS; SUBCUTANEOUS at 16:26

## 2017-04-18 RX ADMIN — DEXAMETHASONE SODIUM PHOSPHATE 10 MG: 4 INJECTION, SOLUTION INTRA-ARTICULAR; INTRALESIONAL; INTRAMUSCULAR; INTRAVENOUS; SOFT TISSUE at 15:09

## 2017-04-18 NOTE — PERIOP NOTES
Discharge instructions reviewed with patient and family  Prescriptions given  Hard copy signed and placed on the chart  Verbalized understanding

## 2017-04-18 NOTE — INTERVAL H&P NOTE
H&P Update:  Dewayne Goldberg was seen and examined. History and physical has been reviewed. The patient has been examined.  There have been no significant clinical changes since the completion of the originally dated History and Physical.    Signed By: Cristi Mendoza DO     April 18, 2017 2:31 PM

## 2017-04-18 NOTE — OP NOTES
[address]   OPERATIVE REPORT       Name:  Jaspal Peña   MR#:  [id number]   :  1985   Account #:  [de-identified]   Date of Adm:  2017       DATE OF PROCEDURE: 2017     PREOPERATIVE DIAGNOSIS: Hemorrhoids with thrombosis and   bleeding. POSTOPERATIVE DIAGNOSIS: Hemorrhoids with thrombosis and   bleeding. PROCEDURE: Hemorrhoidectomy with Harmonic scalpel. ANESTHESIA: General endotracheal.    SURGEON: John Paul Albarado DO.    ASSISTANT: None. COMPLICATIONS: None. DISPOSITION: Stable. COUNTS: Sponge count, needle count, instrument count, all   correct x3. DESCRIPTION OF PROCEDURE: This is a 69-year-old female   postpartum 1 week with thrombosed hemorrhoids, pain and   bleeding. She is prepared for hemorrhoidectomy with Harmonic   scalpel. She had been administered a bowel prep with MiraLax the   night prior to the exam and consent was obtained by describing   the procedure to the patient, including the potential   complications to include infection, bleeding and pain. Consent   was obtained, placed upon the chart. She was administered Ancef   2 grams IV preoperatively, taken to the operative suite, placed   in a supine position and general anesthesia was initiated   without complications. She was transferred to lithotomy, prepped   and draped in the usual sterile fashion. Time-out was taken to   confirm the patient's name and proper procedure. Following this,   a rectal speculum was placed into the anal vault and a brief   survey revealed a large thrombosed hemorrhoid with skin necrosis   at the 3 o'clock position. There was an additional thrombosed   hemorrhoid at the 7 o'clock position. No other hemorrhoids   noted.  At this point, we decided to resect the hemorrhoids using   a Harmonic scalpel, 0.25% with epinephrine was used to   anesthetize the skin and subcutaneous tissue and then a Harmonic   scalpel was used to resect the hemorrhoid spanning from the 2   o'clock to the 4 o'clock position. Harmonic scalpel was used   to create hemostasis. The hemorrhoid was sent as 3 o'clock   hemorrhoid. We then addressed the 7 o'clock hemorrhoid. We anesthetized the skin and subcutaneous tissue with 0.25% Marcaine   with epinephrine and the hemorrhoid was resected using the   Harmonic scalpel, labeled as 7 o'clock hemorrhoid. At this   point, hemostasis was confirmed. We added additional local   anesthesia. We then placed a fibrillar soaked sponge with   Silvadene and viscous lidocaine, and concluded the case. She   tolerated the procedure well without immediate complications. FINDINGS: A 43-year-old female postpartum 1 week with thrombosed   hemorrhoids. Harmonic scalpel hemorrhoidectomy was performed at   the 3 and 7 o'clock positions without immediate complications. She tolerated the procedure well. NAMRATA BENJAMIN DO DD / Callum Officer   D:  04/18/2017   15:40   T:  04/18/2017   17:06   Job #:  [id number]

## 2017-04-18 NOTE — ANESTHESIA POSTPROCEDURE EVALUATION
Post-Anesthesia Evaluation and Assessment    Patient: Lawyer Clifford MRN: [id number]  SSN: [id number]    YOB: 1985  Age: 32 y.o. Sex: female       Cardiovascular Function/Vital Signs  Visit Vitals    /81    Pulse 92    Temp 36.7 °C (98 °F)    Resp 14    Ht 5' 1\" (1.549 m)    Wt 76.6 kg (168 lb 12.8 oz)    SpO2 97%    BMI 31.89 kg/m2       Patient is status post general anesthesia for Procedure(s): HEMORRHOIDECTOMY . Nausea/Vomiting: None    Postoperative hydration reviewed and adequate. Pain:  Pain Scale 1: Numeric (0 - 10) (04/18/17 1626)  Pain Intensity 1: 8 (04/18/17 1626)   Managed    Neurological Status:   Neuro (WDL): Exceptions to WDL (04/18/17 1548)  Neuro  Neurologic State: Alert (04/18/17 1618)  Cognition: Follows commands (04/18/17 1603)  LUE Motor Response: Purposeful (04/18/17 1553)  LLE Motor Response: Purposeful (04/18/17 1553)  RUE Motor Response: Purposeful (04/18/17 1553)  RLE Motor Response: Purposeful (04/18/17 1553)   At baseline    Mental Status and Level of Consciousness: Arousable    Pulmonary Status:   O2 Device: Room air (04/18/17 1618)   Adequate oxygenation and airway patent    Complications related to anesthesia: None    Post-anesthesia assessment completed.  No concerns    Signed By: Syeda Schneider MD     April 18, 2017

## 2017-04-18 NOTE — ANESTHESIA PREPROCEDURE EVALUATION
Anesthetic History   No history of anesthetic complications            Review of Systems / Medical History  Patient summary reviewed and pertinent labs reviewed    Pulmonary  Within defined limits                 Neuro/Psych   Within defined limits           Cardiovascular  Within defined limits                Exercise tolerance: >4 METS     GI/Hepatic/Renal  Within defined limits              Endo/Other  Within defined limits           Other Findings              Physical Exam    Airway  Mallampati: II  TM Distance: > 6 cm  Neck ROM: normal range of motion   Mouth opening: Normal     Cardiovascular  Regular rate and rhythm,  S1 and S2 normal,  no murmur, click, rub, or gallop  Rhythm: regular  Rate: normal         Dental  No notable dental hx       Pulmonary  Breath sounds clear to auscultation               Abdominal  GI exam deferred       Other Findings            Anesthetic Plan    ASA: 2  Anesthesia type: general          Induction: Intravenous  Anesthetic plan and risks discussed with: Patient      Pt is 1 week post-partum. She is using donor breast milk for her  girl who is over in the NICU at [facility], so she is pumping and dumping for at least 2-3 days.

## 2017-04-18 NOTE — IP AVS SNAPSHOT
[employer] 
 
 
 [address] [place] [address] 
[phone] Patient: Chester Arroyo MRN: [id number] :1985 You are allergic to the following No active allergies Recent Documentation Height Weight BMI OB Status Smoking Status 1.549 m 76.6 kg 31.89 kg/m2 Postpartum Never Smoker Emergency Contacts Name Discharge Info Relation Home Work Mobile [address] [employer] [facility] Conjunct CAREGIVER [3] Spouse [3]   [phone] Hood Gorman DISCHARGE CAREGIVER [3] Mother [14]   [phone] About your hospitalization You were admitted on:  2017 You last received care in the:  [facility] PACU You were discharged on:  2017 Unit phone number:  [phone] Why you were hospitalized Your primary diagnosis was:  Not on File Providers Seen During Your Hospitalizations Provider Role Specialty Primary office phone Catarino Cary DO Attending Provider General Surgery [phone] Your Primary Care Physician (PCP) Primary Care Physician Office Phone [facility] [phone] Follow-up Information Follow up With Details Comments Contact Info Idalia Vásquez MD   [facility] [address] 
[phone] Moose Jones DO Schedule an appointment as soon as possible for a visit today  [address] [address] [address] [facility] [address] 
[phone] Your Appointments Monday May 08, 2017 10:45 AM EDT Global Post Op with Moose Jones DO  
[facility] ([facility]) [address] [address] [phone] Current Discharge Medication List  
  
START taking these medications Dose & Instructions Dispensing Information Comments Morning Noon Evening Bedtime  
 dibucaine 1 % ointment Commonly known as:  Eileen Silverman Your last dose was: Your next dose is:    
   
   
 Apply  to affected area three (3) times daily. Quantity:  30 g Refills:  0  
     
   
   
   
  
 magnesium hydroxide 400 mg/5 mL suspension Commonly known as:  MILK OF MAGNESIA Your last dose was: Your next dose is:    
   
   
 Dose:  30 mL Take 30 mL by mouth daily. Quantity:  1 Bottle Refills:  3  
     
   
   
   
  
 mineral oil liquid Your last dose was: Your next dose is:    
   
   
 Dose:  15 mL Take 15 mL by mouth daily. Quantity:  1 Bottle Refills:  3  
     
   
   
   
  
 oxyCODONE-acetaminophen 5-325 mg per tablet Commonly known as:  PERCOCET Your last dose was: Your next dose is:    
   
   
 Dose:  2 Tab Take 2 Tabs by mouth every four (4) hours as needed for Pain. Max Daily Amount: 12 Tabs. Quantity:  40 Tab Refills:  0 CONTINUE these medications which have NOT CHANGED Dose & Instructions Dispensing Information Comments Morning Noon Evening Bedtime  
 amoxicillin 250 mg capsule Commonly known as:  AMOXIL Your last dose was: Your next dose is:    
   
   
 Dose:  500 mg Take 2 Caps by mouth three (3) times daily for 3 days. Indications: GENITOURINARY TRACT INFECTION DUE TO PROTEUS Quantity:  9 Cap Refills:  0  
     
   
   
   
  
 amoxicillin-clavulanate 875-125 mg per tablet Commonly known as:  AUGMENTIN Your last dose was: Your next dose is:    
   
   
 Dose:  1 Tab Take 1 Tab by mouth two (2) times a day. Quantity:  14 Tab Refills:  0  
     
   
   
   
  
 azithromycin 250 mg tablet Commonly known as:  Russ Messina Your last dose was: Your next dose is:    
   
   
 Dose:  500 mg Take 2 Tabs by mouth daily for 3 doses. Quantity:  6 Tab Refills:  0 Cholecalciferol (Vitamin D3) 3,000 unit Tab Your last dose was: Your next dose is: Take  by mouth. Per anesthesia protocol: pt instructed to stop med 7 days prior to day of surgery. Refills:  0  
     
   
   
   
  
 COLACE 100 mg capsule Generic drug:  docusate sodium Your last dose was: Your next dose is:    
   
   
 Dose:  200 mg Take 200 mg by mouth two (2) times a day. Refills:  0  
     
   
   
   
  
 * hydrocortisone 2.5 % rectal cream  
Commonly known as:  ANUSOL-HC Your last dose was: Your next dose is: Insert  into rectum four (4) times daily. Quantity:  30 g Refills:  0  
     
   
   
   
  
 * hydrocortisone 2.5 % rectal cream  
Commonly known as:  PROCTO-MED HC Your last dose was: Your next dose is: Insert  into rectum four (4) times daily. Quantity:  30 g Refills:  0 MIRALAX 17 gram packet Generic drug:  polyethylene glycol Your last dose was: Your next dose is:    
   
   
 Dose:  17 g Take 17 g by mouth daily. Refills:  0 PRENATAL DHA+COMPLETE PRENATAL -300 mg-mcg-mg Cmpk Generic drug:  PNV no.24-iron-folic acid-dha Your last dose was: Your next dose is: Take  by mouth. Refills:  0  
     
   
   
   
  
 TYLENOL EXTRA STRENGTH 500 mg tablet Generic drug:  acetaminophen Your last dose was: Your next dose is: Take  by mouth every six (6) hours as needed for Pain. Refills:  0  
     
   
   
   
  
 * Notice: This list has 2 medication(s) that are the same as other medications prescribed for you. Read the directions carefully, and ask your doctor or other care provider to review them with you. Where to Get Your Medications Information on where to get these meds will be given to you by the nurse or doctor. ! Ask your nurse or doctor about these medications  
  dibucaine 1 % ointment  
 magnesium hydroxide 400 mg/5 mL suspension  
 mineral oil liquid  
 oxyCODONE-acetaminophen 5-325 mg per tablet Discharge Instructions PER DR BENJAMIN_ 
OK TO DO SITZ BATHS 
KEEP INCISION CLEAN WITH REGULAR SOAP AND WATER 
NO LIFTING OVER 10 LBS REGULAR DIET AS TOLERATED 
MAY REMOVE TOPICAL DRESSING ON DAY 2-KEEP STERISTRIPS IN PLACE UNTIL FOLLOW UP WITH DR BENJAMIN 
NO DRIVING ON PAIN MEDS RESUME HOME MEDS AS USUAL Hemorrhoidectomy: What to Expect at [facility] Your Recovery After you have hemorrhoids removed, you can expect to feel better each day. Your anal area will be painful or ache for 2 to 4 weeks. And you may need pain medicine. It is common to have some light bleeding and clear or yellow fluids from your anus. This is most likely when you have a bowel movement. These symptoms may last for 1 to 2 months after surgery. After 1 to 2 weeks, you should be able to do most of your normal activities. But don't do things that require a lot of effort. It is important to avoid heavy lifting and straining with bowel movements while you recover. This care sheet gives you a general idea about how long it will take for you to recover. But each person recovers at a different pace. Follow the steps below to get better as quickly as possible. How can you care for yourself at home? Activity · Rest when you feel tired. · Be active. Walking is a good choice. · Allow your body to heal. Don't move quickly or lift anything heavy until you are feeling better. · You may take showers and baths as usual. Pat your anal area dry when you are done. · You will probably need to take 1 to 2 weeks off work. It depends on the type of work you do and how you feel. Diet · Follow your doctor's instructions about eating after surgery.  
· Start adding high-fiber foods to your diet 2 or 3 days after your surgery. This will make bowel movements easier. And it lowers the chance that you will get hemorrhoids again. · If your bowel movements are not regular right after surgery, try to avoid constipation and straining. Drink plenty of water. Your doctor may suggest fiber, a stool softener, or a mild laxative. Medications · Your doctor will tell you if and when you can restart your medicines. He or she will also give you instructions about taking any new medicines. · If you take blood thinners, such as warfarin (Coumadin), clopidogrel (Plavix), or aspirin, be sure to talk to your doctor. He or she will tell you if and when to start taking those medicines again. Make sure that you understand exactly what your doctor wants you to do. · Be safe with medicines. Read and follow all instructions on the label. ¨ If the doctor gave you a prescription medicine for pain, take it as prescribed. ¨ If you are not taking a prescription pain medicine, ask your doctor if you can take an over-the-counter medicine. · If your doctor prescribed antibiotics, take them as directed. Do not stop taking them just because you feel better. You need to take the full course of antibiotics. · You may apply numbing medicines before and after bowel movements to relieve pain. Other instructions · Sit in a few inches of warm water (sitz bath) for 15 to 20 minutes 3 times a day and after bowel movements. Then pat the area dry. Do this as long as you have pain in your anal area. · Avoid sitting on the toilet for long periods of time or straining during bowel movements. · Keep your anal area clean. · Support your feet with a small step stool when you sit on the toilet. This helps flex your hips and places your pelvis in a squatting position. This can make bowel movements easier after surgery. · Use baby wipes or medicated pads, such as Tucks, instead of toilet paper after a bowel movement. These products do not irritate the anus. · If your doctor recommends it, use an over-the-counter hydrocortisone cream on the skin in your anal area. This can reduce pain and itching after surgery. · Apply ice several times a day for 10 minutes at a time. · Try lying on your stomach with a pillow under your hips to decrease swelling. Follow-up care is a key part of your treatment and safety. Be sure to make and go to all appointments, and call your doctor if you are having problems. It's also a good idea to know your test results and keep a list of the medicines you take. When should you call for help? Call 911 anytime you think you may need emergency care. For example, call if: 
· You passed out (lost consciousness). · You have sudden chest pain and shortness of breath, or you cough up blood. · You have severe belly pain. Call your doctor now or seek immediate medical care if: 
· You have bleeding from your anus that soaks 2 or more large gauze pads. · You have pain that does not get better after you take your pain medicine. · You have signs of infection, such as: 
¨ Increased pain, swelling, warmth, or redness. ¨ Red streaks leading from the wound. ¨ Pus draining from the wound. ¨ A fever. · You have trouble passing urine or stool, especially if you have pain or swelling in your lower belly. Watch closely for changes in your health, and be sure to contact your doctor if: 
· You do not have a bowel movement after taking a laxative. · You do not get better as expected. Where can you learn more? Go to http://steven-viviane.info/. Enter 96 [id number] in the search box to learn more about \"Hemorrhoidectomy: What to Expect at Home. \" Current as of: August 9, 2016 Content Version: 11.2 © 4007-3631 [employer]. Care instructions adapted under license by [employer] (which disclaims liability or warranty for this information).  If you have questions about a medical condition or this instruction, always ask your healthcare professional. [place] [address] any warranty or liability for your use of this information. Discharge Orders None Introducing [facility]! [employer] introduces [employer] patient portal. Now you can access parts of your medical record, email your doctor's office, and request medication refills online. 1. In your internet browser, go to https://[employer] ([employer]). [employer]) 2. Click on the First Time User? Click Here link in the Sign In box. You will see the New Member Sign Up page. 3. Enter your [employer] Access Code exactly as it appears below. You will not need to use this code after youve completed the sign-up process. If you do not sign up before the expiration date, you must request a new code. · [employer] Access Code: [email or web address] Expires: 7/1/2017  2:25 PM 
 
4. Enter the last four digits of your Social Security Number (xxxx) and Date of Birth (mm/dd/yyyy) as indicated and click Submit. You will be taken to the next sign-up page. 5. Create a [employer] ID. This will be your [employer] login ID and cannot be changed, so think of one that is secure and easy to remember. 6. Create a [employer] password. You can change your password at any time. 7. Enter your Password Reset Question and Answer. This can be used at a later time if you forget your password. 8. Enter your e-mail address. You will receive e-mail notification when new information is available in [address]. 9. Click Sign Up. You can now view and download portions of your medical record. 10. Click the Download Summary menu link to download a portable copy of your medical information. If you have questions, please visit the Frequently Asked Questions section of the [employer] website. Remember, [employer] is NOT to be used for urgent needs. For medical emergencies, dial 911. Now available from your iPhone and Android! General Information Please provide this summary of care documentation to your next provider. Patient Signature:  ____________________________________________________________ Date:  ____________________________________________________________  
  
Neita Hidden Provider Signature:  ____________________________________________________________ Date:  ____________________________________________________________

## 2017-04-18 NOTE — H&P (VIEW-ONLY)
Ricardo Jerez DO  [address] [address]  Phone [phone]   Fax [phone]      Date of visit: 2017     Primary/Requesting provider: Surendra Weaver MD    Chief Complaint   Patient presents with    New Patient     thrombosed hemorroid                  Date: 2017      Name: Ramana Gibson      MRN: [id number]       : 1985       Age: 32 y.o. Sex: female        PCP: Surendra Weaver MD       CC:    Chief Complaint   Patient presents with    New Patient     thrombosed hemorroid        HPI:     Ramana Gibson is a 32 y.o. female who presents for evaluation of thrombosed hemorrhoids. This is a healthy 28-year-old female postpartum 1 week with thrombosed hemorrhoids she is referred directly from the emergency room. She states that her hemorrhoids became thrombosed during her pregnancy and they are 10 out of 10 painful and causing her some bleeding. 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 She denies seeing olamide red blood in the toilet. She also adds that she has had hemorrhoid problems since her first pregnancy 4 years ago. She states that she manually reduces the hemorrhoids after having bowel movements causing her constant frustration. She denies any other problems with thrombosed hemorrhoids or bleeding. Her  is present and states that there is a black spot that he has noticed when putting hemorrhoid cream in the area. Mathew Ambrosio PMH:    Past Medical History:   Diagnosis Date    Asthma     exercise induced    Supervision of high-risk pregnancy 2016    Vaginal bleeding in first trimester 2016    No further bleeding.        PSH:    Past Surgical History:   Procedure Laterality Date    HX DILATION AND CURETTAGE      HX WISDOM TEETH EXTRACTION         MEDS:    Current Outpatient Prescriptions   Medication Sig    docusate sodium (COLACE) 100 mg capsule Take 200 mg by mouth two (2) times a day.  polyethylene glycol (MIRALAX) 17 gram packet Take 17 g by mouth daily.  amoxicillin-clavulanate (AUGMENTIN) 875-125 mg per tablet Take 1 Tab by mouth two (2) times a day.  hydrocortisone (ANUSOL-HC) 2.5 % rectal cream Insert  into rectum four (4) times daily.  hydrocortisone (PROCTO-MED HC) 2.5 % rectal cream Insert  into rectum four (4) times daily.  Cholecalciferol, Vitamin D3, 3,000 unit tab Take  by mouth.  PNV no.24-iron-folic acid-dha (PRENATAL DHA+COMPLETE PRENATAL) -300 mg-mcg-mg cmpk Take  by mouth.  acetaminophen (TYLENOL EXTRA STRENGTH) 500 mg tablet Take  by mouth every six (6) hours as needed for Pain.  azithromycin (ZITHROMAX) 250 mg tablet Take 2 Tabs by mouth daily for 3 doses.  amoxicillin (AMOXIL) 250 mg capsule Take 2 Caps by mouth three (3) times daily for 3 days. Indications: GENITOURINARY TRACT INFECTION DUE TO PROTEUS     Current Facility-Administered Medications   Medication    HYDROXYprogesterone caproate injection 250 mg       ALLERGIES:      No Known Allergies    SH:    Social History   Substance Use Topics    Smoking status: Never Smoker    Smokeless tobacco: Never Used    Alcohol use No      Comment: occ       FH:    Family History   Problem Relation Age of Onset    Cancer Father     Kidney Disease Maternal Grandmother          Review of Systems   Constitutional: Negative. HENT: Negative. Eyes: Negative. Respiratory: Negative. Cardiovascular: Negative. Gastrointestinal: Negative. Genitourinary: Negative. Musculoskeletal: Negative. Skin:        External hemorrhoid x 4 days. Neurological: Negative. Endo/Heme/Allergies: Negative. Psychiatric/Behavioral: Negative.            Physical Exam:     Visit Vitals    /76    Pulse 70    Ht 5' 1\" (1.549 m)    Wt 170 lb (77.1 kg)    LMP 09/07/2016 (Exact Date)    BMI 32.12 kg/m2         Physical Exam   Constitutional: She is oriented to person, place, and time and well-developed, well-nourished, and in no distress. HENT:   Head: Normocephalic and atraumatic. Mouth/Throat: Oropharynx is clear and moist.   Eyes: EOM are normal. Pupils are equal, round, and reactive to light. Neck: Normal range of motion. Neck supple. No tracheal deviation present. No thyromegaly present. Cardiovascular: Normal rate, regular rhythm and normal heart sounds. No murmur heard. Pulmonary/Chest: Effort normal and breath sounds normal. No respiratory distress. She has no wheezes. She has no rales. Abdominal: Soft. Bowel sounds are normal. She exhibits no distension and no mass. There is no tenderness. There is no rebound and no guarding. Musculoskeletal: Normal range of motion. She exhibits no edema. Neurological: She is alert and oriented to person, place, and time. Skin: Skin is warm and dry. No erythema. Psychiatric: Mood and judgment normal.    rectal exam: There is a large thrombosed hemorrhoid at the 3 o'clock position spanning from the 2:00 down to the 5 or 6 o'clock position. There is an area of ischemic change but no evidence of bleeding or infection. There is tenderness to palpation. She has normal rectal tone. Assessment/Plan:  Rosy Hui is a 32 y.o. female who has signs and symptoms consistent with thrombosed hemorrhoids. I have recommended a hemorrhoidectomy with Harmonic Scalpel. I have also recommended a MiraLAX bowel prep the night prior to the exam.  I discussed the details of the procedure with the patient and her  today in the office. I explained the risks and benefits and potential complications including the severity of pain. We will see her for hemorrhoidectomy sometime this week. ICD-10-CM ICD-9-CM    1. Hemorrhoids with complication Y49.2 021.3        I went through the risks of bleeding, infection and anesthesia. Counseling time:not applicable    Signed: Macarena Jones DO   4/17/2017  10:35 AM

## 2017-04-18 NOTE — DISCHARGE INSTRUCTIONS
PER DR BENJAMIN_  OK TO DO SITZ BATHS  KEEP INCISION CLEAN WITH REGULAR SOAP AND WATER  NO LIFTING OVER 10 LBS  REGULAR DIET AS TOLERATED  MAY REMOVE TOPICAL DRESSING ON DAY 2-KEEP STERISTRIPS IN PLACE UNTIL FOLLOW UP WITH DR BENJAMIN  NO DRIVING ON PAIN MEDS  RESUME HOME MEDS AS USUAL     Hemorrhoidectomy: What to Expect at Home  Your Recovery    After you have hemorrhoids removed, you can expect to feel better each day. Your anal area will be painful or ache for 2 to 4 weeks. And you may need pain medicine. It is common to have some light bleeding and clear or yellow fluids from your anus. This is most likely when you have a bowel movement. These symptoms may last for 1 to 2 months after surgery. After 1 to 2 weeks, you should be able to do most of your normal activities. But don't do things that require a lot of effort. It is important to avoid heavy lifting and straining with bowel movements while you recover. This care sheet gives you a general idea about how long it will take for you to recover. But each person recovers at a different pace. Follow the steps below to get better as quickly as possible. How can you care for yourself at home? Activity  · Rest when you feel tired. · Be active. Walking is a good choice. · Allow your body to heal. Don't move quickly or lift anything heavy until you are feeling better. · You may take showers and baths as usual. Pat your anal area dry when you are done. · You will probably need to take 1 to 2 weeks off work. It depends on the type of work you do and how you feel. Diet  · Follow your doctor's instructions about eating after surgery. · Start adding high-fiber foods to your diet 2 or 3 days after your surgery. This will make bowel movements easier. And it lowers the chance that you will get hemorrhoids again. · If your bowel movements are not regular right after surgery, try to avoid constipation and straining. Drink plenty of water.  Your doctor may suggest fiber, a stool softener, or a mild laxative. Medications  · Your doctor will tell you if and when you can restart your medicines. He or she will also give you instructions about taking any new medicines. · If you take blood thinners, such as warfarin (Coumadin), clopidogrel (Plavix), or aspirin, be sure to talk to your doctor. He or she will tell you if and when to start taking those medicines again. Make sure that you understand exactly what your doctor wants you to do. · Be safe with medicines. Read and follow all instructions on the label. ¨ If the doctor gave you a prescription medicine for pain, take it as prescribed. ¨ If you are not taking a prescription pain medicine, ask your doctor if you can take an over-the-counter medicine. · If your doctor prescribed antibiotics, take them as directed. Do not stop taking them just because you feel better. You need to take the full course of antibiotics. · You may apply numbing medicines before and after bowel movements to relieve pain. Other instructions  · Sit in a few inches of warm water (sitz bath) for 15 to 20 minutes 3 times a day and after bowel movements. Then pat the area dry. Do this as long as you have pain in your anal area. · Avoid sitting on the toilet for long periods of time or straining during bowel movements. · Keep your anal area clean. · Support your feet with a small step stool when you sit on the toilet. This helps flex your hips and places your pelvis in a squatting position. This can make bowel movements easier after surgery. · Use baby wipes or medicated pads, such as Tucks, instead of toilet paper after a bowel movement. These products do not irritate the anus. · If your doctor recommends it, use an over-the-counter hydrocortisone cream on the skin in your anal area. This can reduce pain and itching after surgery. · Apply ice several times a day for 10 minutes at a time.   · Try lying on your stomach with a pillow under your hips to decrease swelling. Follow-up care is a key part of your treatment and safety. Be sure to make and go to all appointments, and call your doctor if you are having problems. It's also a good idea to know your test results and keep a list of the medicines you take. When should you call for help? Call 911 anytime you think you may need emergency care. For example, call if:  · You passed out (lost consciousness). · You have sudden chest pain and shortness of breath, or you cough up blood. · You have severe belly pain. Call your doctor now or seek immediate medical care if:  · You have bleeding from your anus that soaks 2 or more large gauze pads. · You have pain that does not get better after you take your pain medicine. · You have signs of infection, such as:  ¨ Increased pain, swelling, warmth, or redness. ¨ Red streaks leading from the wound. ¨ Pus draining from the wound. ¨ A fever. · You have trouble passing urine or stool, especially if you have pain or swelling in your lower belly. Watch closely for changes in your health, and be sure to contact your doctor if:  · You do not have a bowel movement after taking a laxative. · You do not get better as expected. Where can you learn more? Go to http://steven-viviane.info/. Enter 96 [id number] in the search box to learn more about \"Hemorrhoidectomy: What to Expect at Home. \"  Current as of: August 9, 2016  Content Version: 11.2  © 6325-5200 [employer]. Care instructions adapted under license by [employer] (which disclaims liability or warranty for this information). If you have questions about a medical condition or this instruction, always ask your healthcare professional. [address] any warranty or liability for your use of this information.

## 2018-12-16 NOTE — ED PROVIDER NOTES
HPI Comments: 51-year-old female status post childbirth with a complaint of painful hemorrhoid. Patient's been using witch hazel pads without relief. Patient thinks there might be some pus draining from the area. No fevers or chills. Patient is breast-feeding. Patient's on oxycodone since having some difficulty with bowel movements. She been taking MiraLAX daily. Patient had this same complaint at the time of delivery and is brought to the attention of the obstetrician's did not think any intervention was needed at that time. Patient is a 32 y.o. female presenting with hemorrhoids. The history is provided by the patient and the spouse. Hemorrhoids    This is a new problem. The current episode started more than 2 days ago. The stool is described as blood tinged. Pertinent negatives include no abdominal pain, no flatus, no abdominal distention and no nausea. She has tried oral laxatives and osmotic agents for the symptoms. Her past medical history does not include irritable bowel syndrome, small bowel obstruction, abdominal surgery or nursing home resident. Past Medical History:   Diagnosis Date    Asthma     exercise induced    Breast feeding status of mother     premature delivery wk 4/10/17, currently pumping- infant in [address] [place] [address] of high-risk pregnancy 11/9/2016    Vaginal bleeding in first trimester 12/2/2016    No further bleeding. Past Surgical History:   Procedure Laterality Date    HX DILATION AND CURETTAGE      HX WISDOM TEETH EXTRACTION           Family History:   Problem Relation Age of Onset    Cancer Father     Kidney Disease Maternal Grandmother        Social History     Social History    Marital status:      Spouse name: N/A    Number of children: N/A    Years of education: N/A     Occupational History    Not on file.      Social History Main Topics    Smoking status: Never Smoker    Smokeless tobacco: Never Used    Alcohol use No Comment: occ    Drug use: No    Sexual activity: Yes     Partners: Male     Birth control/ protection: None     Other Topics Concern    Not on file     Social History Narrative         ALLERGIES: Review of patient's allergies indicates no known allergies. Review of Systems   Constitutional: Negative. Negative for activity change. HENT: Negative. Eyes: Negative. Respiratory: Negative. Cardiovascular: Negative. Gastrointestinal: Positive for hemorrhoids. Negative for abdominal distention, abdominal pain, flatus and nausea. Genitourinary: Negative. Musculoskeletal: Negative. Skin: Negative. Neurological: Negative. Psychiatric/Behavioral: Negative. All other systems reviewed and are negative. Vitals:    04/15/17 0325 04/15/17 0452   BP: (!) 138/93 143/77   Pulse: 94 72   Resp: 18 18   SpO2: 96%    Weight: 81.6 kg (180 lb)    Height: 5' 1\" (1.549 m)             Physical Exam   Constitutional: She is oriented to person, place, and time. She appears well-developed and well-nourished. No distress. HENT:   Head: Normocephalic and atraumatic. Right Ear: External ear normal.   Left Ear: External ear normal.   Nose: Nose normal.   Mouth/Throat: Oropharynx is clear and moist. No oropharyngeal exudate. Eyes: Conjunctivae and EOM are normal. Pupils are equal, round, and reactive to light. Right eye exhibits no discharge. Left eye exhibits no discharge. No scleral icterus. Neck: Normal range of motion. Neck supple. No JVD present. No tracheal deviation present. Cardiovascular: Normal rate, regular rhythm and intact distal pulses. Pulmonary/Chest: Effort normal and breath sounds normal. No stridor. No respiratory distress. She has no wheezes. She exhibits no tenderness. Abdominal: Soft. Bowel sounds are normal. She exhibits no distension and no mass. There is no tenderness. Genitourinary: Rectal exam shows external hemorrhoid and fissure.        Musculoskeletal: Normal range of motion. She exhibits no edema or tenderness. Neurological: She is alert and oriented to person, place, and time. No cranial nerve deficit. Skin: Skin is warm and dry. No rash noted. She is not diaphoretic. No erythema. No pallor. Psychiatric: She has a normal mood and affect. Her behavior is normal. Thought content normal.   Nursing note and vitals reviewed. MDM  Number of Diagnoses or Management Options  Hemorrhoids, thrombosed:   Diagnosis management comments: Post vaginal delivery hemorrhoid thrombosed hemorrhoid. We'll attempt to get her some pain relief and into see  A colorectal surgeon as soon as possible.     ED Course       Procedures No

## 2021-02-14 ENCOUNTER — HOSPITAL ENCOUNTER (EMERGENCY)
Age: 36
Discharge: HOME | End: 2021-02-14
Attending: STUDENT IN AN ORGANIZED HEALTH CARE EDUCATION/TRAINING PROGRAM
Payer: COMMERCIAL

## 2021-02-14 VITALS
TEMPERATURE: 98.5 F | WEIGHT: 170 LBS | SYSTOLIC BLOOD PRESSURE: 140 MMHG | OXYGEN SATURATION: 98 % | HEIGHT: 61 IN | BODY MASS INDEX: 32.1 KG/M2 | RESPIRATION RATE: 161 BRPM | HEART RATE: 90 BPM | DIASTOLIC BLOOD PRESSURE: 73 MMHG

## 2021-02-14 DIAGNOSIS — S39.012A BACK STRAIN, INITIAL ENCOUNTER: Primary | ICD-10-CM

## 2021-02-14 PROCEDURE — 99282 EMERGENCY DEPT VISIT SF MDM: CPT

## 2021-02-14 RX ORDER — METHOCARBAMOL 750 MG/1
750 TABLET, FILM COATED ORAL 4 TIMES DAILY
Qty: 20 TAB | Refills: 0 | Status: SHIPPED | OUTPATIENT
Start: 2021-02-14 | End: 2021-02-19

## 2021-02-14 RX ORDER — LIDOCAINE 50 MG/G
PATCH TOPICAL
Qty: 1 EACH | Refills: 0 | Status: SHIPPED | OUTPATIENT
Start: 2021-02-14

## 2021-02-14 RX ORDER — METHOCARBAMOL 750 MG/1
750 TABLET, FILM COATED ORAL 4 TIMES DAILY
Qty: 20 TAB | Refills: 0 | Status: SHIPPED | OUTPATIENT
Start: 2021-02-14 | End: 2021-02-14 | Stop reason: SDUPTHER

## 2021-02-14 NOTE — ED NOTES
I have reviewed discharge instructions with the patient. The patient verbalized understanding. Patient left ED via Discharge Method: ambulatory to Home with self. Opportunity for questions and clarification provided. Patient given 2 scripts. To continue your aftercare when you leave the hospital, you may receive an automated call from our care team to check in on how you are doing. This is a free service and part of our promise to provide the best care and service to meet your aftercare needs.  If you have questions, or wish to unsubscribe from this service please call [phone]. Thank you for Choosing our [facility] Emergency Department.

## 2021-02-14 NOTE — ED TRIAGE NOTES
Pt states that her upper back has been hurting since she slept on it wrong last Sunday. PT states that she has tried many things without relief this week. Pt thinks that she aggravated it more when she carried her daughter up the stairs tonight.

## 2021-02-14 NOTE — ED PROVIDER NOTES
27-year-old female presents to the emergency part complaining of mid back pain. States she had pain last week when she slept on it wrong. Is been also in Tylenol Motrin. Also states she is been taking intermittent tramadol for this at home. Continued mid back pain. Denies fever, chills, nausea, vomiting, dysuria, vaginal discharge or vaginal bleeding. Reports pain worsened after she bent over to  her child. Denies numbness or tingling lower extremities. Denies weakness in lower extremities. Past Medical History:   Diagnosis Date    Asthma     exercise induced    Breast feeding status of mother     premature delivery wk 4/10/17, currently pumping- infant in [address] [facility] [facility] [address] of high-risk pregnancy 11/9/2016    Vaginal bleeding in first trimester 12/2/2016    No further bleeding.        Past Surgical History:   Procedure Laterality Date    HX DILATION AND CURETTAGE      HX HEMORRHOIDECTOMY  04/18/2017    HX WISDOM TEETH EXTRACTION           Family History:   Problem Relation Age of Onset    Cancer Father     Kidney Disease Maternal Grandmother        Social History     Socioeconomic History    Marital status:      Spouse name: Not on file    Number of children: Not on file    Years of education: Not on file    Highest education level: Not on file   Occupational History    Not on file   Social Needs    Financial resource strain: Not on file    Food insecurity     Worry: Not on file     Inability: Not on file    Transportation needs     Medical: Not on file     Non-medical: Not on file   Tobacco Use    Smoking status: Never Smoker    Smokeless tobacco: Never Used   Substance and Sexual Activity    Alcohol use: No     Comment: occ    Drug use: No    Sexual activity: Yes     Partners: Male     Birth control/protection: None   Lifestyle    Physical activity     Days per week: Not on file     Minutes per session: Not on file    Stress: Not on file Relationships    Social connections     Talks on phone: Not on file     Gets together: Not on file     Attends Orthodox service: Not on file     Active member of club or organization: Not on file     Attends meetings of clubs or organizations: Not on file     Relationship status: Not on file    Intimate partner violence     Fear of current or ex partner: Not on file     Emotionally abused: Not on file     Physically abused: Not on file     Forced sexual activity: Not on file   Other Topics Concern    Not on file   Social History Narrative    Not on file         ALLERGIES: Patient has no known allergies. Review of Systems   Constitutional: Negative for chills, fatigue and fever. HENT: Negative for facial swelling and sore throat. Eyes: Negative for visual disturbance. Respiratory: Negative for cough, chest tightness and shortness of breath. Cardiovascular: Negative for chest pain and palpitations. Gastrointestinal: Negative for abdominal pain, diarrhea, nausea and vomiting. Genitourinary: Negative for difficulty urinating, dysuria and flank pain. Musculoskeletal: Positive for back pain. Negative for myalgias, neck pain and neck stiffness. Skin: Negative for color change. Neurological: Negative for dizziness, speech difficulty, weakness, numbness and headaches. Psychiatric/Behavioral: Negative for confusion. Vitals:    02/14/21 0048   BP: (!) 140/73   Pulse: 90   Resp: (!) 161   Temp: 98.5 °F (36.9 °C)   SpO2: 98%   Weight: 77.1 kg (170 lb)   Height: 5' 1\" (1.549 m)            Physical Exam  Vitals signs and nursing note reviewed. Constitutional:       Appearance: Normal appearance. She is not ill-appearing or toxic-appearing. HENT:      Head: Normocephalic and atraumatic. Nose: Nose normal.      Mouth/Throat:      Mouth: Mucous membranes are moist.   Eyes:      Extraocular Movements: Extraocular movements intact. Neck:      Musculoskeletal: Normal range of motion.  No neck rigidity. Pulmonary:      Effort: Pulmonary effort is normal. No respiratory distress. Abdominal:      General: Abdomen is flat. Musculoskeletal: Normal range of motion. General: Tenderness present. Comments: Paraspinal muscle tenderness lower thoracic spine region. ,  No evidence of muscle spasm, no step-offs or deformities. Skin:     General: Skin is warm and dry. Neurological:      General: No focal deficit present. Mental Status: She is alert and oriented to person, place, and time. Psychiatric:         Mood and Affect: Mood normal.          MDM  Number of Diagnoses or Management Options  Back strain, initial encounter  Diagnosis management comments: 28-year-old female presents with paraspinal muscle pain, patient likely with muscle strain. Patient was encouraged to continue taking Tylenol Motrin at home. Will add Robaxin as well as Lidoderm patches. She will follow up with primary care physician next week. Return to the ER for worsening or worrisome symptoms. Patient has no signs or symptoms of underlying infection, normal vital signs. Patient voiced understanding agreement this plan.          Procedures

## 2021-02-14 NOTE — DISCHARGE INSTRUCTIONS
Continue taking scheduled Motrin as well as Tylenol. Take Robaxin as prescribed. Use Lidoderm patches as needed as well. Follow-up with them as next week. Return to the ER for worsening or worrisome symptoms.

## 2022-03-19 PROBLEM — O34.32 MATERNAL CARE FOR CERVICAL INCOMPETENCE IN SECOND TRIMESTER: Status: ACTIVE | Noted: 2017-01-05

## 2022-12-15 ENCOUNTER — HOSPITAL ENCOUNTER (EMERGENCY)
Dept: GENERAL RADIOLOGY | Age: 37
End: 2022-12-15
Payer: COMMERCIAL

## 2022-12-15 ENCOUNTER — HOSPITAL ENCOUNTER (EMERGENCY)
Age: 37
Discharge: HOME | End: 2022-12-15
Attending: EMERGENCY MEDICINE
Payer: COMMERCIAL

## 2022-12-15 VITALS
HEIGHT: 61 IN | DIASTOLIC BLOOD PRESSURE: 83 MMHG | RESPIRATION RATE: 16 BRPM | SYSTOLIC BLOOD PRESSURE: 142 MMHG | OXYGEN SATURATION: 95 % | TEMPERATURE: 98.5 F | WEIGHT: 185 LBS | HEART RATE: 84 BPM | BODY MASS INDEX: 34.93 KG/M2

## 2022-12-15 DIAGNOSIS — S29.019A THORACIC MYOFASCIAL STRAIN, INITIAL ENCOUNTER: ICD-10-CM

## 2022-12-15 DIAGNOSIS — M62.838 SPASM OF MUSCLE: Primary | ICD-10-CM

## 2022-12-15 PROCEDURE — 6370000000 HC RX 637 (ALT 250 FOR IP): Performed by: PHYSICIAN ASSISTANT

## 2022-12-15 PROCEDURE — 72072 X-RAY EXAM THORAC SPINE 3VWS: CPT

## 2022-12-15 PROCEDURE — 99283 EMERGENCY DEPT VISIT LOW MDM: CPT

## 2022-12-15 RX ORDER — HYDROCODONE BITARTRATE AND ACETAMINOPHEN 5; 325 MG/1; MG/1
1 TABLET ORAL EVERY 6 HOURS PRN
Qty: 5 TABLET | Refills: 0 | Status: SHIPPED | OUTPATIENT
Start: 2022-12-15 | End: 2022-12-18

## 2022-12-15 RX ORDER — LIDOCAINE 50 MG/G
1 PATCH TOPICAL DAILY
Qty: 30 PATCH | Refills: 0 | Status: SHIPPED | OUTPATIENT
Start: 2022-12-15 | End: 2023-01-14

## 2022-12-15 RX ORDER — DICLOFENAC POTASSIUM 50 MG/1
50 TABLET, FILM COATED ORAL 3 TIMES DAILY PRN
Qty: 30 TABLET | Refills: 0 | Status: SHIPPED | OUTPATIENT
Start: 2022-12-15

## 2022-12-15 RX ORDER — LIDOCAINE 4 G/G
2 PATCH TOPICAL
Status: DISCONTINUED | OUTPATIENT
Start: 2022-12-15 | End: 2022-12-15 | Stop reason: HOSPADM

## 2022-12-15 RX ORDER — BUTALBITAL, ACETAMINOPHEN AND CAFFEINE 50; 325; 40 MG/1; MG/1; MG/1
2 TABLET ORAL
Status: COMPLETED | OUTPATIENT
Start: 2022-12-15 | End: 2022-12-15

## 2022-12-15 RX ADMIN — BUTALBITAL, ACETAMINOPHEN, AND CAFFEINE 2 TABLET: 50; 325; 40 TABLET ORAL at 10:05

## 2022-12-15 ASSESSMENT — ENCOUNTER SYMPTOMS
BOWEL INCONTINENCE: 0
ALLERGIC/IMMUNOLOGIC NEGATIVE: 1
EYES NEGATIVE: 1
GASTROINTESTINAL NEGATIVE: 1
ABDOMINAL PAIN: 0
BACK PAIN: 1
ABDOMINAL SWELLING: 0
RESPIRATORY NEGATIVE: 1

## 2022-12-15 ASSESSMENT — LIFESTYLE VARIABLES
HOW MANY STANDARD DRINKS CONTAINING ALCOHOL DO YOU HAVE ON A TYPICAL DAY: 1 OR 2
HOW OFTEN DO YOU HAVE A DRINK CONTAINING ALCOHOL: MONTHLY OR LESS

## 2022-12-15 NOTE — ED PROVIDER NOTES
Emergency Department Provider Note                   PCP:                Abena Spears MD               Age: 40 y.o. Sex: female       ICD-10-CM    1. Spasm of muscle  M62.838 HYDROcodone-acetaminophen (NORCO) 5-325 MG per tablet      2. Thoracic myofascial strain, initial encounter  S29.019A HYDROcodone-acetaminophen ([address]) 5-325 MG per tablet          DISPOSITION Decision To Discharge 12/15/2022 10:51:15 AM        MDM     Amount and/or Complexity of Data Reviewed  Tests in the radiology section of CPT®: ordered and reviewed    Risk of Complications, Morbidity, and/or Mortality  Presenting problems: moderate  Diagnostic procedures: moderate  Management options: moderate  General comments: Patient was given a lidocaine patch and Fioricet here for spasm. She is driving. She received a Toradol shot earlier from immediate care according to her. The meds and patch here have helped her pain. I personally showed her stretching exercises to complete at home. I will treat patient for rhomboid strain/spasm and have her use warm, moist heat to area, massage, gentle range of motion and stretching to area. Use the medication as directed, ED if worse. I will refer to a chiropractor for follow up if needed. Also, follow up with PCP for recheck. She is stable for discharge and ambulatory out of the ED without difficulty at this time.          Patient Progress  Patient progress: improved             Orders Placed This Encounter   Procedures    XR THORACIC SPINE (3 VIEWS)        Medications   lidocaine 4 % external patch 2 patch (2 patches TransDERmal Patch Applied 12/15/22 1005)   butalbital-acetaminophen-caffeine (FIORICET, ESGIC) per tablet 2 tablet (2 tablets Oral Given 12/15/22 1005)       Discharge Medication List as of 12/15/2022 10:59 AM        START taking these medications    Details   lidocaine (LIDODERM) 5 % Place 1 patch onto the skin daily 12 hours on, 12 hours off., Disp-30 patch, R-0Print HYDROcodone-acetaminophen (NORCO) 5-325 MG per tablet Take 1 tablet by mouth every 6 hours as needed for Pain (One of three scripts) for up to 3 days. Intended supply: 3 days. Take lowest dose possible to manage pain, Disp-5 tablet, R-0Print      diclofenac (CATAFLAM) 50 MG tablet Take 1 tablet by mouth 3 times daily as needed for Pain (With food), Disp-30 tablet, R-0Print              Brendon Magana is a 40 y.o. female who presents to the Emergency Department with chief complaint of    Chief Complaint   Patient presents with    Back Pain      Patient is here with right-sided mid back pain. She states on Sunday, 4 days ago she was working out at home and feels like she overdid it doing \"LAT pulls. \"  She states she then moved a farm table by herself. Since then she has had pain in the area. She states she has taken Zanaflex and cyclobenzaprine at home without relief. She went to [facility] urgent care where they prescribed methocarbamol which made her nauseous last night. She went to immediate care this morning and they gave her a Toradol shot because she states \"the nurse practitioner could not write controlled substances. I just need relief of this pain. \"  She went to [facility] yesterday and states one of her friends also went and was given pain medicine without pain and she was not. She has been using ice without relief. Moving hurts. She states they saw the same doctor. No chest pain, shortness of breath,  abdominal pain, dizziness, weakness, dyspnea exertion, orthopnea, swelling/tingling or weakness to their arms or legs, trouble with urination or bowel movements or other new symptoms. They did ambulate to the room without difficulty and is well hydrated. The history is provided by the patient.    Back Pain  Location:  Thoracic spine  Quality:  Aching  Radiates to:  Does not radiate  Pain severity:  Moderate  Pain is:  Same all the time  Onset quality:  Gradual  Duration:  4 days  Timing: Constant  Progression:  Worsening  Chronicity:  New  Context: lifting heavy objects and recent injury    Context: not emotional stress, not falling, not jumping from heights, not MCA, not MVA, not occupational injury, not pedestrian accident, not physical stress, not recent illness and not twisting    Relieved by:  Nothing  Worsened by:  Bending  Ineffective treatments:  Bed rest, being still, cold packs, ibuprofen and muscle relaxants  Associated symptoms: no abdominal pain, no abdominal swelling, no bladder incontinence, no bowel incontinence, no chest pain, no dysuria, no fever, no headaches, no leg pain, no numbness, no paresthesias, no pelvic pain, no perianal numbness, no tingling, no weakness and no weight loss        Review of Systems   Constitutional: Negative. Negative for fever and weight loss. HENT: Negative. Eyes: Negative. Respiratory: Negative. Cardiovascular: Negative. Negative for chest pain. Gastrointestinal: Negative. Negative for abdominal pain and bowel incontinence. Endocrine: Negative. Genitourinary: Negative. Negative for bladder incontinence, dysuria and pelvic pain. Musculoskeletal:  Positive for back pain. Skin: Negative. Allergic/Immunologic: Negative. Neurological: Negative. Negative for tingling, weakness, numbness, headaches and paresthesias. Hematological: Negative. Psychiatric/Behavioral: Negative. All other systems reviewed and are negative. Past Medical History:   Diagnosis Date    Asthma     exercise induced    Breast feeding status of mother     premature delivery wk 4/10/17, currently pumping- infant in [address] [place] [address] of high-risk pregnancy 11/9/2016    Vaginal bleeding in pregnancy, unspecified trimester 12/2/2016    No further bleeding.         Past Surgical History:   Procedure Laterality Date    DILATION AND CURETTAGE OF UTERUS      HEMORRHOID SURGERY  04/18/2017    WISDOM TOOTH EXTRACTION          Family History   Problem Relation Age of Onset    Cancer Father     Kidney Disease Maternal Grandmother         Social History     Socioeconomic History    Marital status:    Tobacco Use    Smoking status: Never    Smokeless tobacco: Never   Substance and Sexual Activity    Alcohol use: No    Drug use: No         Naproxen     Discharge Medication List as of 12/15/2022 10:59 AM        CONTINUE these medications which have NOT CHANGED    Details   acetaminophen (TYLENOL) 500 MG tablet Take by mouth every 6 hours as neededHistorical Med      amoxicillin (AMOXIL) 250 MG capsule Take 500 mg by mouth 3 times dailyHistorical Med      amoxicillin-clavulanate (AUGMENTIN) 875-125 MG per tablet Take 1 tablet by mouth 2 times dailyHistorical Med      azithromycin (ZITHROMAX) 250 MG tablet Take 500 mg by mouth dailyHistorical Med      Cholecalciferol 75 MCG (3000 UT) TABS Take by mouthHistorical Med      dibucaine (NUPERCAINAL) 1 % ointment Apply topically 3 times daily, Topical, 3 TIMES DAILY Starting Tue 4/18/2017, Historical Med      docusate (COLACE, DULCOLAX) 100 MG CAPS Take 200 mg by mouth 2 times dailyHistorical Med      hydrocortisone 2.5 % cream Place rectally 4 times daily, Rectal, 4 TIMES DAILY Starting Sat 4/15/2017, Historical Med      HYDROXYprogesterone caproate 250 MG/ML OIL oil injection Inject 250 mg into the muscle every 7 daysHistorical Med      magnesium hydroxide (MILK OF MAGNESIA) 400 MG/5ML suspension Take 30 mLs by mouth dailyHistorical Med      oxyCODONE-acetaminophen (PERCOCET) 5-325 MG per tablet Take 2 tablets by mouth every 4 hours as needed. Historical Med      polyethylene glycol (GLYCOLAX) 17 GM/SCOOP powder Take 17 g by mouth dailyHistorical Med              Vitals signs and nursing note reviewed. Patient Vitals for the past 4 hrs:   Temp Pulse Resp BP SpO2   12/15/22 0933 98.5 °F (36.9 °C) 84 16 (!) 142/83 95 %          Physical Exam  Vitals and nursing note reviewed.    Constitutional: Appearance: Normal appearance. HENT:      Head: Normocephalic and atraumatic. Right Ear: External ear normal.      Left Ear: External ear normal.      Nose: Nose normal.      Mouth/Throat:      Mouth: Mucous membranes are moist.   Eyes:      Extraocular Movements: Extraocular movements intact. Conjunctiva/sclera: Conjunctivae normal.      Pupils: Pupils are equal, round, and reactive to light. Cardiovascular:      Rate and Rhythm: Normal rate. Pulses: Normal pulses. Pulmonary:      Effort: Pulmonary effort is normal.   Abdominal:      General: Abdomen is flat. Musculoskeletal:         General: Tenderness and signs of injury present. No swelling or deformity. Normal range of motion. Cervical back: Normal and normal range of motion. Thoracic back: Spasms present. Lumbar back: Normal.        Back:       Right lower leg: No edema. Left lower leg: No edema. Comments: There is tenderness palpation and spasm over the right rhomboid area, palpation of this area reproduces the patient's pain. She does have full range of motion of the shoulder and is distally neurovascularly intact. Skin:     General: Skin is warm. Capillary Refill: Capillary refill takes less than 2 seconds. Neurological:      General: No focal deficit present. Mental Status: She is alert and oriented to person, place, and time. Mental status is at baseline. Psychiatric:         Mood and Affect: Mood normal.         Behavior: Behavior normal.         Thought Content: Thought content normal.         Judgment: Judgment normal.        Procedures    Results for orders placed or performed during the hospital encounter of 12/15/22   XR THORACIC SPINE (3 VIEWS)    Narrative    THORACIC SPINE AP AND LATERAL VIEWS    HISTORY: Right-sided mid back pain. FINDINGS: The thoracic vertebrae are normal in height and alignment. Included  portions of the lungs are clear.       Impression    Unremarkable thoracic radiographs. XR THORACIC SPINE (3 VIEWS)   Final Result   Unremarkable thoracic radiographs. Voice dictation software was used during the making of this note. This software is not perfect and grammatical and other typographical errors may be present. This note has not been completely proofread for errors.      LIZBETH Pratt  12/15/22 1267

## 2022-12-15 NOTE — Clinical Note
Mini Freeman was seen and treated in our emergency department on 12/15/2022. She may return to work on 12/19/2022. If you have any questions or concerns, please don't hesitate to call.       LIZBETH Miles

## 2022-12-15 NOTE — ED TRIAGE NOTES
Pt states seen at [facility] urgent care yesterday for back pain. Pt states pain is mid back and feels sore. Pt states muscle relaxers from [facility] did not work.

## 2022-12-15 NOTE — Clinical Note
Mario Alberto Diamond was seen and treated in our emergency department on 12/15/2022. She may return to work on 12/19/2022. If you have any questions or concerns, please don't hesitate to call.       LIZBETH Zamudio

## 2022-12-15 NOTE — DISCHARGE INSTRUCTIONS
Use warm, moist heat to area multiple times a day, massage, gentle range of motion and stretching to area. Use the medication as directed, ED if worse. I will refer to a chiropractor for follow up if needed. Also, follow up with PCP for recheck. This will NOT resolve without the specific stretching, heat and massage.

## (undated) DEVICE — GOWN,REINFORCED,POLY,AURORA,XXLARGE,STR: Brand: MEDLINE

## (undated) DEVICE — MEDI-VAC YANKAUER SUCTION HANDLE W/BULBOUS TIP: Brand: CARDINAL HEALTH

## (undated) DEVICE — PERI-PAD,MODERATE: Brand: CURITY

## (undated) DEVICE — REM POLYHESIVE ADULT PATIENT RETURN ELECTRODE: Brand: VALLEYLAB

## (undated) DEVICE — AGENT HEMSTAT W1XL2IN ABSRB SFT LTWT LAYERED ORC FIBRILLAR

## (undated) DEVICE — NEEDLE HYPO 25GA L1.5IN BLU POLYPR HUB S STL REG BVL STR

## (undated) DEVICE — (D)SYR 10ML 1/5ML GRAD NSAF -- PKGING CHANGE USE ITEM 338027

## (undated) DEVICE — AGENT HEMSTAT W4XL4IN OXIDIZED REGENERATED CELOS ABSRB SFT

## (undated) DEVICE — HANDPIECE LAP L23MM DIA5MM VES SEAL CUT CRV JAW PSTL GRP

## (undated) DEVICE — BUTTON SWITCH PENCIL BLADE ELECTRODE, HOLSTER: Brand: EDGE

## (undated) DEVICE — SOLUTION IV 1000ML 0.9% SOD CHL

## (undated) DEVICE — LITHOTOMY: Brand: MEDLINE INDUSTRIES, INC.

## (undated) DEVICE — KENDALL SCD EXPRESS SLEEVES, KNEE LENGTH, MEDIUM: Brand: KENDALL SCD

## (undated) DEVICE — JELLY LUBRICATING 10GM PREFIL SYR LUBE

## (undated) DEVICE — MATERNITY KNIT PANTS,SEAMLESS: Brand: WINGS

## (undated) DEVICE — ABDOMINAL PAD: Brand: DERMACEA

## (undated) DEVICE — TRAY PREP DRY W/ PREM GLV 2 APPL 6 SPNG 2 UNDPD 1 OVERWRAP